# Patient Record
Sex: FEMALE | Race: AMERICAN INDIAN OR ALASKA NATIVE
[De-identification: names, ages, dates, MRNs, and addresses within clinical notes are randomized per-mention and may not be internally consistent; named-entity substitution may affect disease eponyms.]

---

## 2017-10-04 ENCOUNTER — HOSPITAL ENCOUNTER (EMERGENCY)
Dept: HOSPITAL 43 - DL.ED | Age: 42
Discharge: HOME | End: 2017-10-04
Payer: SELF-PAY

## 2017-10-04 VITALS — DIASTOLIC BLOOD PRESSURE: 77 MMHG | SYSTOLIC BLOOD PRESSURE: 159 MMHG

## 2017-10-04 DIAGNOSIS — M62.830: Primary | ICD-10-CM

## 2017-10-04 DIAGNOSIS — F17.210: ICD-10-CM

## 2017-10-04 PROCEDURE — 99283 EMERGENCY DEPT VISIT LOW MDM: CPT

## 2017-10-04 NOTE — EDM.PDOC
ED HPI GENERAL MEDICAL PROBLEM





- General


Chief Complaint: Back Pain or Injury


Stated Complaint: BACK PROBLEMS, [id number]


Time Seen by Provider: 10/04/17 21:35


Source of Information: Reports: Patient


History Limitations: Reports: No Limitations





- History of Present Illness


INITIAL COMMENTS - FREE TEXT/NARRATIVE: 





c/o sudden onset of low back pain while at work at [employer] this afternoon.. Has 

had similar episode one time prior years ago. Tried ibuprofen but has not 

helped. No prior injuries, Notes sx last tie resolved with muscle relaxants. 


Onset: Today


Treatments PTA: Reports: NSAIDS


  ** Lower Back


Pain Score (Numeric/FACES): 10





- Related Data


 Allergies











Allergy/AdvReac Type Severity Reaction Status Date / Time


 


No Known Allergies Allergy   Verified 10/04/17 20:02











Home Meds: 


 Home Meds





Acetaminophen [Tylenol] 650 tab PO ACBRK PRN 07/27/15 [History]


Ibuprofen 2 tab PO ASDIRECTED PRN 07/27/15 [History]











Past Medical History





- Past Health History


Medical/Surgical History: Denies Medical/Surgical History


Other Cardiovascular History: States she was told she had high blood pressure 

at one time but was not treated.  Had a heart murmur when she was younger.


Other Gastrointestinal History: had heartburn when she was pregnant


Other Neuro History: sinus headaches





- Past Surgical History


Other GI Surgeries/Procedures: history of alcohol poisoning





Social & Family History





- Tobacco Use


Smoking Status *Q: Current Every Day Smoker


Years of Tobacco use: 29


Packs/Tins Daily: 0.2


Used Tobacco, but Quit: No


Second Hand Smoke Exposure: No





- Caffeine Use


Caffeine Use: Reports: Coffee





- Alcohol Use


Days Per Week of Alcohol Use: 2


Number of Drinks Per Day: 5


Total Drinks Per Week: 10





- Recreational Drug Use


Recreational Drug Use: No





ED ROS GENERAL





- Review of Systems


Review Of Systems: See Below


Constitutional: Reports: No Symptoms


HEENT: Reports: No Symptoms


Respiratory: Reports: No Symptoms


Cardiovascular: Reports: No Symptoms


GI/Abdominal: Reports: No Symptoms


Musculoskeletal: Reports: Back Pain


Skin: Reports: No Symptoms


Neurological: Reports: No Symptoms





ED EXAM,LOWER BACK PAIN/INJURY





- Physical Exam


Exam: See Below


Exam Limited By: No Limitations


General Appearance: Alert, Mild Distress


Ears: Normal External Exam


Nose: Normal Inspection


Throat/Mouth: Normal Inspection


Head: Atraumatic


Neck: Normal Inspection, Full Range of Motion


Respiratory/Chest: No Respiratory Distress, Lungs Clear, Normal Breath Sounds


Cardiovascular: Normal Peripheral Pulses, Regular Rate, Rhythm


GI/Abdominal: Soft


Back Exam: Decreased Range of Motion, Muscle Spasm (right lower), Paraspinal 

Tenderness.  No: Vertebral Tenderness


Extremities: Normal Range of Motion


Neurological: Alert, Normal Mood/Affect, Normal Dorsiflexion, Normal Plantar 

Flexion, Normal Gait, No Motor/Sensory Deficits, Oriented x 3


Psychiatric: Normal Affect, Normal Mood


Skin Exam: Warm, Dry, Intact, Normal Color





Course





- Vital Signs


Last Recorded V/S: 


 Last Vital Signs











Temp  98.8 F   10/04/17 20:06


 


Pulse  78   10/04/17 20:06


 


Resp  20   10/04/17 20:06


 


BP  159/77 H  10/04/17 20:08


 


Pulse Ox  100   10/04/17 20:06














- Orders/Labs/Meds


Meds: 


Medications














Discontinued Medications














Generic Name Dose Route Start Last Admin





  Trade Name Freq  PRN Reason Stop Dose Admin


 


Cyclobenzaprine HCl  10 mg  10/04/17 21:43  10/04/17 21:48





  Flexeril  PO  10/04/17 21:44  10 mg





  ONETIME ONE   Administration


 


Ibuprofen  600 mg  10/04/17 21:43  10/04/17 21:48





  Motrin  PO  10/04/17 21:44  600 mg





  ONETIME ONE   Administration














Departure





- Departure


Time of Disposition: 22:10


Disposition: Home, Self-Care 01


Condition: Good


Clinical Impression: 


 Spasm of back muscles








- Discharge Information


Instructions:  Back Pain, Adult, Easy-to-Read


Referrals: 


 Raghavendra Li [Primary Care Provider] - 


Forms:  ED Department Discharge


Additional Instructions: 


flexeril 10mg one every 8 hours as needed for muscle spasm #4


Ibuprofen 600mg every 6 hours as needed for pain


clinic follow up on Friday if not improved


alternate heat and ice to low back

## 2020-01-07 ENCOUNTER — HOSPITAL ENCOUNTER (EMERGENCY)
Dept: HOSPITAL 43 - DL.ED | Age: 45
Discharge: LEFT BEFORE DISCHARGE | End: 2020-01-07
Payer: SELF-PAY

## 2020-01-07 VITALS — DIASTOLIC BLOOD PRESSURE: 94 MMHG | HEART RATE: 102 BPM | SYSTOLIC BLOOD PRESSURE: 142 MMHG

## 2020-01-07 DIAGNOSIS — F10.10: Primary | ICD-10-CM

## 2020-01-07 LAB
ANION GAP SERPL CALC-SCNC: 16.1 MMOL/L
APAP SERPL-SCNC: < 10 UG/ML
CHLORIDE SERPL-SCNC: 102 MMOL/L (ref 101–111)
SODIUM SERPL-SCNC: 140 MMOL/L (ref 135–145)

## 2020-01-07 PROCEDURE — 80305 DRUG TEST PRSMV DIR OPT OBS: CPT

## 2020-01-07 PROCEDURE — 80320 DRUG SCREEN QUANTALCOHOLS: CPT

## 2020-01-07 PROCEDURE — 81001 URINALYSIS AUTO W/SCOPE: CPT

## 2020-01-07 PROCEDURE — 85025 COMPLETE CBC W/AUTO DIFF WBC: CPT

## 2020-01-07 PROCEDURE — 83735 ASSAY OF MAGNESIUM: CPT

## 2020-01-07 PROCEDURE — G0480 DRUG TEST DEF 1-7 CLASSES: HCPCS

## 2020-01-07 PROCEDURE — 83690 ASSAY OF LIPASE: CPT

## 2020-01-07 PROCEDURE — 82150 ASSAY OF AMYLASE: CPT

## 2020-01-07 PROCEDURE — 96365 THER/PROPH/DIAG IV INF INIT: CPT

## 2020-01-07 PROCEDURE — 81025 URINE PREGNANCY TEST: CPT

## 2020-01-07 PROCEDURE — 80053 COMPREHEN METABOLIC PANEL: CPT

## 2020-01-07 PROCEDURE — 99284 EMERGENCY DEPT VISIT MOD MDM: CPT

## 2020-01-07 PROCEDURE — 80329 ANALGESICS NON-OPIOID 1 OR 2: CPT

## 2020-01-07 PROCEDURE — 82140 ASSAY OF AMMONIA: CPT

## 2020-01-07 PROCEDURE — 36415 COLL VENOUS BLD VENIPUNCTURE: CPT

## 2020-01-07 PROCEDURE — 99283 EMERGENCY DEPT VISIT LOW MDM: CPT

## 2020-01-07 NOTE — EDM.PDOC
ED HPI GENERAL MEDICAL PROBLEM





- General


Chief Complaint: Drug or Alcohol Abuse


Stated Complaint: AMBULANCE


Time Seen by Provider: 20 22:30


History Limitations: Reports: No Limitations





- History of Present Illness


INITIAL COMMENTS - FREE TEXT/NARRATIVE: 





This 43 yo female patient reports to the ED due to feeling dehydrated, having 

ringing in her right ear and having intermittent episodes of vomiting. The 

patient reports she drinks alcohol daily and vomits daily. The patient reports 

she has been drinking today, but does not think she is drunk at this time. The 

patient reports she is depressed due to her significant other dying. The 

patient reports that she does not want treatment. The patient reports she has 

no plans on harming herself. EMS reports the patient stated she was talking to 

her  significant other while in her home. 


Onset: Unknown/Unsure


Duration: Constant


Location: Reports: Other


Quality: Reports: Other


Severity: Moderate


Improves with: Reports: None


Worsens with: Reports: None


Context: Reports: Other


Associated Symptoms: Reports: Nausea/Vomiting, Other





- Related Data


 Allergies











Allergy/AdvReac Type Severity Reaction Status Date / Time


 


No Known Allergies Allergy   Verified 10/04/17 20:02











Home Meds: 


 Home Meds





Acetaminophen [Tylenol] 650 tab PO ACBRK PRN 07/27/15 [History]


Ibuprofen 2 tab PO ASDIRECTED PRN 07/27/15 [History]











Past Medical History





- Past Health History


Medical/Surgical History: Denies Medical/Surgical History


Other Cardiovascular History: States she was told she had high blood pressure 

at one time but was not treated.  Had a heart murmur when she was younger.


Other Gastrointestinal History: had heartburn when she was pregnant


Other Neuro History: sinus headaches





- Past Surgical History


Other GI Surgeries/Procedures: history of alcohol poisoning





Social & Family History





- Family History


Family Medical History: Noncontributory





- Tobacco Use


Smoking Status *Q: Never Smoker


Second Hand Smoke Exposure: No





- Caffeine Use


Caffeine Use: Reports: None





- Alcohol Use


Days Per Week of Alcohol Use: 7


Number of Drinks Per Day: 3


Total Drinks Per Week: 21





- Recreational Drug Use


Recreational Drug Use: No





ED ROS GENERAL





- Review of Systems


Review Of Systems: Comprehensive ROS is negative, except as noted in HPI.





- Physical Exam


Exam: See Below


Exam Limited By: No Limitations


General Appearance: Alert, WD/WN, Moderate Distress, Obese


Eye Exam: Bilateral Eye: EOMI, Normal Inspection, PERRL


Ears: Normal External Exam, Normal Canal, Hearing Grossly Normal, Other (right 

TM slightly erythematous with no visible fluids)


Nose: Normal Inspection, Normal Mucosa, No Blood


Throat/Mouth: Normal Inspection, Normal Lips, Normal Teeth, Normal Gums, Normal 

Oropharynx, Normal Voice, No Airway Compromise


Head Exam: Atraumatic, Normocephalic


Neck: Normal Inspection, Supple, Non-Tender, Full Range of Motion


Respiratory/Chest: No Respiratory Distress, Lungs Clear, Normal Breath Sounds, 

No Accessory Muscle Use, Chest Non-Tender


Cardiovascular: Normal Peripheral Pulses, Regular Rate, Rhythm, No Edema, No 

Gallop, No JVD, No Murmur, No Rub


GI/Abdominal: Normal Bowel Sounds, Soft, Non-Tender, No Organomegaly, No 

Distention, No Abnormal Bruit, No Mass


 (Female) Exam: Deferred


Rectal (Female) Exam: Deferred


Neuro Exam (Abbreviated): Alert, Oriented, CN II-XII Intact, Normal Cognition, 

Normal Gait, Normal Reflexes, No Motor/Sensory Deficits


Back Exam: Normal Inspection, Full Range of Motion, NT


Extremities: Normal Inspection, Normal Range of Motion, Non-Tender, No Pedal 

Edema, Normal Capillary Refill


Psychiatric: Normal Affect, Normal Mood


Skin Exam: Warm, Dry, Intact, Normal Color, No Rash





Course





- Vital Signs


Last Recorded V/S: 


 Last Vital Signs











Temp  36.1 C   20 22:20


 


Pulse  102 H  20 22:20


 


Resp  18   20 22:20


 


BP  142/94 H  20 22:20


 


Pulse Ox  97   20 22:20














- Orders/Labs/Meds


Labs: 


 Laboratory Tests











  20 Range/Units





  22:37 22:37 22:37 


 


WBC    9.8  (5.0-10.0)  10^3/uL


 


RBC    4.54  (4.2-5.4)  10^6/uL


 


Hgb    14.3  (12.0-16.0)  g/dL


 


Hct    41.7  (37.0-47.0)  %


 


MCV    91.9  D  ()  fL


 


MCH    31.5  (27.0-34.0)  pg


 


MCHC    34.3  (33.0-35.0)  g/dL


 


Plt Count    212  (150-450)  10^3/uL


 


Neut % (Auto)    44.2  (42.2-75.2)  %


 


Lymph % (Auto)    46.6  (20.5-50.1)  %


 


Mono % (Auto)    5.3  (2-8)  %


 


Eos % (Auto)    3.7 H  (1.0-3.0)  %


 


Baso % (Auto)    0.2  (0.0-1.0)  %


 


Sodium     (135-145)  mmol/L


 


Potassium     (3.6-5.0)  mmol/L


 


Chloride     (101-111)  mmol/L


 


Carbon Dioxide     (21.0-31.0)  mmol/L


 


Anion Gap     


 


BUN     (7-18)  mg/dL


 


Creatinine     (0.6-1.3)  mg/dL


 


Est Cr Clr Drug Dosing     mL/min


 


Estimated GFR (MDRD)     


 


BUN/Creatinine Ratio     


 


Glucose     ()  mg/dL


 


Calcium     (8.4-10.2)  mg/dl


 


Magnesium  1.8    (1.8-2.5)  mg/dL


 


Total Bilirubin     (0.2-1.0)  mg/dL


 


AST     (10-42)  IU/L


 


ALT     (10-60)  IU/L


 


Alkaline Phosphatase     ()  IU/L


 


Ammonia   27   (11-35)  umol/L


 


Total Protein     (6.7-8.2)  g/dl


 


Albumin     (3.2-5.5)  g/dl


 


Globulin     


 


Albumin/Globulin Ratio     


 


Amylase  51    ()  U/L


 


Lipase  39    (22-51)  U/L


 


Urine Color     (YELLOW)  


 


Urine Appearance     (CLEAR)  


 


Urine pH     (5.0-9.0)  


 


Ur Specific Gravity     (1.005-1.030)  


 


Urine Protein     (NEGATIVE)  


 


Urine Glucose (UA)     (NEGATIVE)  


 


Urine Ketones     (NEGATIVE)  


 


Urine Occult Blood     (NEGATIVE)  


 


Urine Nitrite     (NEGATIVE)  


 


Urine Bilirubin     (NEGATIVE)  


 


Urine Urobilinogen     (0.2-1.0)  mg/dL


 


Ur Leukocyte Esterase     (NEGATIVE)  


 


Urine RBC     /HPF


 


Urine WBC     (0-5/HPF)  /HPF


 


Ur Epithelial Cells     (NOT SEEN)  /HPF


 


Amorphous Sediment     (NOT SEEN)  /HPF


 


Urine Bacteria     (0-FEW/HPF)  /HPF


 


Urine Mucus     (NOT SEEN)  /LPF


 


Urine HCG, Qual     


 


Urine Opiates Screen     (NEGATIVE)  


 


Ur Oxycodone Screen     (NEGATIVE)  


 


Urine Methadone Screen     (NEGATIVE)  


 


Acetaminophen  < 10    ug/mL


 


Ur Barbiturates Screen     (NEGATIVE)  


 


U Tricyclic Antidepress     (NEGATIVE)  


 


Ur Phencyclidine Scrn     (NEGATIVE)  


 


Ur Amphetamine Screen     (NEGATIVE)  


 


U Methamphetamines Scrn     (NEGATIVE)  


 


Urine MDMA Screen     (NEGATIVE)  


 


U Benzodiazepines Scrn     (NEGATIVE)  


 


Urine Cocaine Screen     (NEGATIVE)  


 


U Marijuana (THC) Screen     (NEGATIVE)  


 


Ethyl Alcohol     mg/dL














  20 Range/Units





  22:37 23:06 23:06 


 


WBC     (5.0-10.0)  10^3/uL


 


RBC     (4.2-5.4)  10^6/uL


 


Hgb     (12.0-16.0)  g/dL


 


Hct     (37.0-47.0)  %


 


MCV     ()  fL


 


MCH     (27.0-34.0)  pg


 


MCHC     (33.0-35.0)  g/dL


 


Plt Count     (150-450)  10^3/uL


 


Neut % (Auto)     (42.2-75.2)  %


 


Lymph % (Auto)     (20.5-50.1)  %


 


Mono % (Auto)     (2-8)  %


 


Eos % (Auto)     (1.0-3.0)  %


 


Baso % (Auto)     (0.0-1.0)  %


 


Sodium  140    (135-145)  mmol/L


 


Potassium  3.1 L    (3.6-5.0)  mmol/L


 


Chloride  102    (101-111)  mmol/L


 


Carbon Dioxide  25.0    (21.0-31.0)  mmol/L


 


Anion Gap  16.1    


 


BUN  7    (7-18)  mg/dL


 


Creatinine  0.7    (0.6-1.3)  mg/dL


 


Est Cr Clr Drug Dosing  84.84    mL/min


 


Estimated GFR (MDRD)  > 60    


 


BUN/Creatinine Ratio  10.00    


 


Glucose  147 H    ()  mg/dL


 


Calcium  8.7    (8.4-10.2)  mg/dl


 


Magnesium     (1.8-2.5)  mg/dL


 


Total Bilirubin  0.7    (0.2-1.0)  mg/dL


 


AST  56 H    (10-42)  IU/L


 


ALT  43    (10-60)  IU/L


 


Alkaline Phosphatase  93    ()  IU/L


 


Ammonia     (11-35)  umol/L


 


Total Protein  8.5 H    (6.7-8.2)  g/dl


 


Albumin  4.1    (3.2-5.5)  g/dl


 


Globulin  4.4    


 


Albumin/Globulin Ratio  0.93    


 


Amylase     ()  U/L


 


Lipase     (22-51)  U/L


 


Urine Color   Light yellow   (YELLOW)  


 


Urine Appearance   Clear   (CLEAR)  


 


Urine pH   5.5   (5.0-9.0)  


 


Ur Specific Gravity   <= 1.005   (1.005-1.030)  


 


Urine Protein   Negative   (NEGATIVE)  


 


Urine Glucose (UA)   Negative   (NEGATIVE)  


 


Urine Ketones   Negative   (NEGATIVE)  


 


Urine Occult Blood   Trace-lysed H   (NEGATIVE)  


 


Urine Nitrite   Negative   (NEGATIVE)  


 


Urine Bilirubin   Negative   (NEGATIVE)  


 


Urine Urobilinogen   0.2   (0.2-1.0)  mg/dL


 


Ur Leukocyte Esterase   Negative   (NEGATIVE)  


 


Urine RBC   0-5   /HPF


 


Urine WBC   0-5   (0-5/HPF)  /HPF


 


Ur Epithelial Cells   Rare   (NOT SEEN)  /HPF


 


Amorphous Sediment   Rare   (NOT SEEN)  /HPF


 


Urine Bacteria   Rare   (0-FEW/HPF)  /HPF


 


Urine Mucus   Rare   (NOT SEEN)  /LPF


 


Urine HCG, Qual    Negative  


 


Urine Opiates Screen     (NEGATIVE)  


 


Ur Oxycodone Screen     (NEGATIVE)  


 


Urine Methadone Screen     (NEGATIVE)  


 


Acetaminophen     ug/mL


 


Ur Barbiturates Screen     (NEGATIVE)  


 


U Tricyclic Antidepress     (NEGATIVE)  


 


Ur Phencyclidine Scrn     (NEGATIVE)  


 


Ur Amphetamine Screen     (NEGATIVE)  


 


U Methamphetamines Scrn     (NEGATIVE)  


 


Urine MDMA Screen     (NEGATIVE)  


 


U Benzodiazepines Scrn     (NEGATIVE)  


 


Urine Cocaine Screen     (NEGATIVE)  


 


U Marijuana (THC) Screen     (NEGATIVE)  


 


Ethyl Alcohol  364    mg/dL














  20 Range/Units





  23:06 


 


WBC   (5.0-10.0)  10^3/uL


 


RBC   (4.2-5.4)  10^6/uL


 


Hgb   (12.0-16.0)  g/dL


 


Hct   (37.0-47.0)  %


 


MCV   ()  fL


 


MCH   (27.0-34.0)  pg


 


MCHC   (33.0-35.0)  g/dL


 


Plt Count   (150-450)  10^3/uL


 


Neut % (Auto)   (42.2-75.2)  %


 


Lymph % (Auto)   (20.5-50.1)  %


 


Mono % (Auto)   (2-8)  %


 


Eos % (Auto)   (1.0-3.0)  %


 


Baso % (Auto)   (0.0-1.0)  %


 


Sodium   (135-145)  mmol/L


 


Potassium   (3.6-5.0)  mmol/L


 


Chloride   (101-111)  mmol/L


 


Carbon Dioxide   (21.0-31.0)  mmol/L


 


Anion Gap   


 


BUN   (7-18)  mg/dL


 


Creatinine   (0.6-1.3)  mg/dL


 


Est Cr Clr Drug Dosing   mL/min


 


Estimated GFR (MDRD)   


 


BUN/Creatinine Ratio   


 


Glucose   ()  mg/dL


 


Calcium   (8.4-10.2)  mg/dl


 


Magnesium   (1.8-2.5)  mg/dL


 


Total Bilirubin   (0.2-1.0)  mg/dL


 


AST   (10-42)  IU/L


 


ALT   (10-60)  IU/L


 


Alkaline Phosphatase   ()  IU/L


 


Ammonia   (11-35)  umol/L


 


Total Protein   (6.7-8.2)  g/dl


 


Albumin   (3.2-5.5)  g/dl


 


Globulin   


 


Albumin/Globulin Ratio   


 


Amylase   ()  U/L


 


Lipase   (22-51)  U/L


 


Urine Color   (YELLOW)  


 


Urine Appearance   (CLEAR)  


 


Urine pH   (5.0-9.0)  


 


Ur Specific Gravity   (1.005-1.030)  


 


Urine Protein   (NEGATIVE)  


 


Urine Glucose (UA)   (NEGATIVE)  


 


Urine Ketones   (NEGATIVE)  


 


Urine Occult Blood   (NEGATIVE)  


 


Urine Nitrite   (NEGATIVE)  


 


Urine Bilirubin   (NEGATIVE)  


 


Urine Urobilinogen   (0.2-1.0)  mg/dL


 


Ur Leukocyte Esterase   (NEGATIVE)  


 


Urine RBC   /HPF


 


Urine WBC   (0-5/HPF)  /HPF


 


Ur Epithelial Cells   (NOT SEEN)  /HPF


 


Amorphous Sediment   (NOT SEEN)  /HPF


 


Urine Bacteria   (0-FEW/HPF)  /HPF


 


Urine Mucus   (NOT SEEN)  /LPF


 


Urine HCG, Qual   


 


Urine Opiates Screen  Negative  (NEGATIVE)  


 


Ur Oxycodone Screen  Negative  (NEGATIVE)  


 


Urine Methadone Screen  Negative  (NEGATIVE)  


 


Acetaminophen   ug/mL


 


Ur Barbiturates Screen  Negative  (NEGATIVE)  


 


U Tricyclic Antidepress  Negative  (NEGATIVE)  


 


Ur Phencyclidine Scrn  Negative  (NEGATIVE)  


 


Ur Amphetamine Screen  Negative  (NEGATIVE)  


 


U Methamphetamines Scrn  Negative  (NEGATIVE)  


 


Urine MDMA Screen  Negative  (NEGATIVE)  


 


U Benzodiazepines Scrn  Negative  (NEGATIVE)  


 


Urine Cocaine Screen  Negative  (NEGATIVE)  


 


U Marijuana (THC) Screen  Negative  (NEGATIVE)  


 


Ethyl Alcohol   mg/dL











Meds: 


Medications














Discontinued Medications














Generic Name Dose Route Start Last Admin





  Trade Name Freq  PRN Reason Stop Dose Admin


 


Multivitamins/Minerals 10 ml/  1,011.2 mls @ 999 mls/hr  20 22:29   22:40





Folic Acid 1 mg/ Thiamine HCl  IV  20 23:29  999 mls/hr





100 mg/ Lactated Ringer's  ONETIME ONE   Administration





     





     





     





     














- Re-Assessments/Exams


Free Text/Narrative Re-Assessment/Exam: 





20 23:34


The patient was advised of the examination and lab results during the visit. 

The patient was advised that her alcohol level is too high for me to release 

her on her own. The patient was advised that she will be sent to detox in order 

to be evaluated by the human services center in the morning. The patient stated 

that she has a friend that is going to come to get her from the ED. The patient 

was again encouraged to allow us to send her to Detox.





Departure





- Departure


Time of Disposition: 23:49


Disposition: Against Medical Advice 07


Condition: Poor


Clinical Impression: 


 Alcohol abuse, Left against medical advice








- Discharge Information


*PRESCRIPTION DRUG MONITORING PROGRAM REVIEWED*: Not Applicable


*COPY OF PRESCRIPTION DRUG MONITORING REPORT IN PATIENT CAM: Not Applicable


Forms:  ED Department Discharge


Care Plan Goals: 


The patient was advised of the examination and lab results during the visit. 

The patient was given IV fluids during the visit. The patient was advised that 

she needs to seek help from the [facility] for further evaluation 

and treatment. The patient was going to be sent to Detox, but the patient left 

against medical advice prior to arrangements could be made for transport to 

Detox. 





Sepsis Event Note





- Evaluation


Sepsis Screening Result: No Definite Risk





- Focused Exam


Vital Signs: 


 Vital Signs











  Temp Pulse Resp BP Pulse Ox


 


 20 22:20  36.1 C  102 H  18  142/94 H  97











Date Exam was Performed: 20


Time Exam was Performed: 23:49

## 2020-07-30 ENCOUNTER — HOSPITAL ENCOUNTER (EMERGENCY)
Dept: HOSPITAL 43 - DL.ED | Age: 45
Discharge: HOME | End: 2020-07-30
Payer: MEDICAID

## 2020-07-30 VITALS — DIASTOLIC BLOOD PRESSURE: 67 MMHG | HEART RATE: 93 BPM | SYSTOLIC BLOOD PRESSURE: 106 MMHG

## 2020-07-30 DIAGNOSIS — J18.9: Primary | ICD-10-CM

## 2020-07-30 DIAGNOSIS — Z79.899: ICD-10-CM

## 2020-07-30 DIAGNOSIS — Z20.828: ICD-10-CM

## 2020-07-30 DIAGNOSIS — I10: ICD-10-CM

## 2020-07-30 LAB
ANION GAP SERPL CALC-SCNC: 17 MEQ/L (ref 7–13)
CHLORIDE SERPL-SCNC: 100 MMOL/L (ref 98–107)
SODIUM SERPL-SCNC: 137 MMOL/L (ref 136–145)

## 2020-07-30 PROCEDURE — U0002 COVID-19 LAB TEST NON-CDC: HCPCS

## 2020-07-30 PROCEDURE — 93005 ELECTROCARDIOGRAM TRACING: CPT

## 2020-07-30 PROCEDURE — 96365 THER/PROPH/DIAG IV INF INIT: CPT

## 2020-07-30 PROCEDURE — 83605 ASSAY OF LACTIC ACID: CPT

## 2020-07-30 PROCEDURE — 87040 BLOOD CULTURE FOR BACTERIA: CPT

## 2020-07-30 PROCEDURE — 99283 EMERGENCY DEPT VISIT LOW MDM: CPT

## 2020-07-30 PROCEDURE — 80053 COMPREHEN METABOLIC PANEL: CPT

## 2020-07-30 PROCEDURE — 99285 EMERGENCY DEPT VISIT HI MDM: CPT

## 2020-07-30 PROCEDURE — 71046 X-RAY EXAM CHEST 2 VIEWS: CPT

## 2020-07-30 PROCEDURE — 84484 ASSAY OF TROPONIN QUANT: CPT

## 2020-07-30 PROCEDURE — 36415 COLL VENOUS BLD VENIPUNCTURE: CPT

## 2020-07-30 PROCEDURE — 96375 TX/PRO/DX INJ NEW DRUG ADDON: CPT

## 2020-07-30 PROCEDURE — 85025 COMPLETE CBC W/AUTO DIFF WBC: CPT

## 2020-07-30 PROCEDURE — 87635 SARS-COV-2 COVID-19 AMP PRB: CPT

## 2020-07-30 NOTE — CR
PROCEDURE INFORMATION: 

Exam: XR Chest, 2 Views 

Exam date and time: 7/30/2020 5:18 PM 

Age: 45 years old 

Clinical indication: Shortness of breath; Additional info: Short of breath 



TECHNIQUE: 

Imaging protocol: XR of the chest 

Views: 2 views. 



COMPARISON: 

No relevant prior studies available. 



FINDINGS: 

Lungs: Patchy infiltrate in the left mid lung field correlate with CT.

Pleural space: Unremarkable. No pleural effusion. No pneumothorax. 

Heart/Mediastinum: Unremarkable. No cardiomegaly. 

Bones/joints: Diastasis of the acromioclavicular joint on the left and possibly 

on the right.. 



IMPRESSION: 

Patchy infiltrate in the left mid lung field. Correlate with CT.

Diastasis of the acromioclavicular joint on the left and possibly on the 

right..

## 2020-07-30 NOTE — EDM.PDOC
ED HPI GENERAL MEDICAL PROBLEM





- General


Chief Complaint: Respiratory Problem


Stated Complaint: AMBULANCE


Time Seen by Provider: 07/30/20 16:50


Source of Information: Reports: Patient


History Limitations: Reports: No Limitations





- History of Present Illness


INITIAL COMMENTS - FREE TEXT/NARRATIVE: 





This 44 yo female patient reports to the ED due to increased shortness of 

breath. The patient was seen at the [facility] yesterday and started on 

a Z-pack and Prednisone. The patient reports she has taken the Azithromycin, but

has not started the prednisone at this time. The patient reports she was advised

to only take the prednisone with food and the patient has not eaten since she 

got the medications. 


Duration: Day(s):, Constant, Getting Worse


Location: Reports: Chest


Quality: Reports: Other


Severity: Moderate


Improves with: Reports: None


Worsens with: Reports: None


Associated Symptoms: Reports: Cough, Shortness of Breath


Treatments PTA: Reports: Other Medication(s)





- Related Data


                                    Allergies











Allergy/AdvReac Type Severity Reaction Status Date / Time


 


No Known Allergies Allergy   Verified 07/30/20 16:18











Home Meds: 


                                    Home Meds





Acetaminophen [Tylenol] 650 tab PO ACBRK PRN 07/27/15 [History]


Ibuprofen 2 tab PO ASDIRECTED PRN 07/27/15 [History]


lisinopriL [Lisinopril] 10 mg PO DAILY 07/30/20 [History]











Past Medical History





- Past Health History


Medical/Surgical History: Denies Medical/Surgical History


Cardiovascular History: Reports: Hypertension


Other Cardiovascular History: Had a heart murmur when she was younger.


Gastrointestinal History: Reports: GERD


Other Gastrointestinal History: had heartburn when she was pregnant


Other Neuro History: sinus headaches





- Past Surgical History


Other GI Surgeries/Procedures: history of alcohol poisoning





Social & Family History





- Family History


Family Medical History: Noncontributory





- Tobacco Use


Smoking Status *Q: Never Smoker





- Caffeine Use


Caffeine Use: Reports: Coffee





- Alcohol Use


Days Per Week of Alcohol Use: 2


Number of Drinks Per Day: 4


Total Drinks Per Week: 8





- Recreational Drug Use


Recreational Drug Use: No





ED ROS GENERAL





- Review of Systems


Review Of Systems: Comprehensive ROS is negative, except as noted in HPI.





ED EXAM, GENERAL





- Physical Exam


Exam: See Below


Exam Limited By: No Limitations


General Appearance: Alert, WD/WN, Mild Distress


Eye Exam: Bilateral Eye: EOMI, Normal Inspection, PERRL


Ears: Normal External Exam, Normal Canal, Hearing Grossly Normal, Normal TMs


Nose: Normal Inspection, Normal Mucosa, No Blood


Throat/Mouth: Normal Inspection, Normal Lips, Normal Teeth, Normal Gums, Normal 

Oropharynx, Normal Voice, No Airway Compromise


Head: Atraumatic, Normocephalic


Neck: Normal Inspection, Supple, Non-Tender, Full Range of Motion


Respiratory/Chest: No Accessory Muscle Use, Chest Non-Tender, Decreased Breath 

Sounds (diffuse)


Cardiovascular: Normal Peripheral Pulses, Regular Rate, Rhythm, No Edema, No 

Gallop, No JVD, No Murmur, No Rub


GI/Abdominal: Normal Bowel Sounds, Soft, Non-Tender, No Organomegaly, No 

Distention, No Abnormal Bruit, No Mass


 (Female) Exam: Deferred


Rectal (Female) Exam: Deferred


Back Exam: Normal Inspection, Full Range of Motion, NT


Extremities: Normal Inspection, Normal Range of Motion, Non-Tender, Normal 

Capillary Refill, No Pedal Edema


Neurological: Alert, Oriented, CN II-XII Intact, Normal Cognition, Normal Gait, 

Normal Reflexes, No Motor/Sensory Deficits


Psychiatric: Normal Affect, Normal Mood


Skin Exam: Warm, Dry, Intact, Normal Color, No Rash


Lymphatic: No Adenopathy





Course





- Vital Signs


Last Recorded V/S: 


                                Last Vital Signs











Temp  36.9 C   07/30/20 16:18


 


Pulse  93   07/30/20 16:18


 


Resp  20   07/30/20 16:18


 


BP  106/67   07/30/20 16:18


 


Pulse Ox  93 L  07/30/20 16:18














- Orders/Labs/Meds


Orders: 


                               Active Orders 24 hr











 Category Date Time Status


 


 EKG Documentation Completion [RC] STAT Care  07/30/20 15:55 Active


 


 CULTURE BLOOD [BC] Stat Lab  07/30/20 16:17 Received











Labs: 


                                Laboratory Tests











  07/30/20 07/30/20 07/30/20 Range/Units





  16:00 16:17 16:17 


 


WBC   11.7 H   (5.0-10.0)  10^3/uL


 


RBC   4.42   (4.2-5.4)  10^6/uL


 


Hgb   13.9   (12.0-16.0)  g/dL


 


Hct   40.8   (37.0-47.0)  %


 


MCV   92.3   ()  fL


 


MCH   31.4   (27.0-34.0)  pg


 


MCHC   34.1   (33.0-35.0)  g/dL


 


Plt Count   161   (150-450)  10^3/uL


 


Neut % (Auto)   85.1 H   (42.2-75.2)  %


 


Lymph % (Auto)   11.1 L   (20.5-50.1)  %


 


Mono % (Auto)   3.8   (2-8)  %


 


Eos % (Auto)   0.0 L   (1.0-3.0)  %


 


Baso % (Auto)   0.0   (0.0-1.0)  %


 


Sodium    137  (136-145)  mmol/L


 


Potassium    3.0 L  (3.5-5.1)  mmol/L


 


Chloride    100  ()  mmol/L


 


Carbon Dioxide    23  (21-32)  mmol/L


 


Anion Gap    17.0 H  (7-13)  mEq/L


 


BUN    15  (7-18)  mg/dL


 


Creatinine    1.55 H  (0.55-1.02)  mg/dL


 


Est Cr Clr Drug Dosing    TNP  


 


Estimated GFR (MDRD)    36  


 


BUN/Creatinine Ratio    9.7  (No establ ref range)  


 


Glucose    103 H  (74-99)  mg/dL


 


Lactic Acid     (0.4-2.0)  mmol/L


 


Calcium    8.1 L  (8.5-10.1)  mg/dL


 


Total Bilirubin    0.6  (0.2-1.0)  mg/dL


 


AST    118 H  (15-37)  U/L


 


ALT    77 H  (14-59)  U/L


 


Alkaline Phosphatase    114  ()  U/L


 


Troponin I    < 0.017  (0.000-0.056)  ng/mL


 


Total Protein    8.2  (6.4-8.2)  g/dL


 


Albumin    3.3 L  (3.4-5.0)  g/dL


 


Globulin    4.9  


 


Albumin/Globulin Ratio    0.67  


 


COVID-19 (S[facility]LD[facility])  Negative    (NEGATIVE)  














  07/30/20 Range/Units





  16:17 


 


WBC   (5.0-10.0)  10^3/uL


 


RBC   (4.2-5.4)  10^6/uL


 


Hgb   (12.0-16.0)  g/dL


 


Hct   (37.0-47.0)  %


 


MCV   ()  fL


 


MCH   (27.0-34.0)  pg


 


MCHC   (33.0-35.0)  g/dL


 


Plt Count   (150-450)  10^3/uL


 


Neut % (Auto)   (42.2-75.2)  %


 


Lymph % (Auto)   (20.5-50.1)  %


 


Mono % (Auto)   (2-8)  %


 


Eos % (Auto)   (1.0-3.0)  %


 


Baso % (Auto)   (0.0-1.0)  %


 


Sodium   (136-145)  mmol/L


 


Potassium   (3.5-5.1)  mmol/L


 


Chloride   ()  mmol/L


 


Carbon Dioxide   (21-32)  mmol/L


 


Anion Gap   (7-13)  mEq/L


 


BUN   (7-18)  mg/dL


 


Creatinine   (0.55-1.02)  mg/dL


 


Est Cr Clr Drug Dosing   


 


Estimated GFR (MDRD)   


 


BUN/Creatinine Ratio   (No establ ref range)  


 


Glucose   (74-99)  mg/dL


 


Lactic Acid  1.1  (0.4-2.0)  mmol/L


 


Calcium   (8.5-10.1)  mg/dL


 


Total Bilirubin   (0.2-1.0)  mg/dL


 


AST   (15-37)  U/L


 


ALT   (14-59)  U/L


 


Alkaline Phosphatase   ()  U/L


 


Troponin I   (0.000-0.056)  ng/mL


 


Total Protein   (6.4-8.2)  g/dL


 


Albumin   (3.4-5.0)  g/dL


 


Globulin   


 


Albumin/Globulin Ratio   


 


COVID-19 (S[facility])   (NEGATIVE)  











Meds: 


Medications














Discontinued Medications














Generic Name Dose Route Start Last Admin





  Trade Name Freq  PRN Reason Stop Dose Admin


 


Ceftriaxone Sodium 1 gm/  50 mls @ 100 mls/hr  07/30/20 17:42  07/30/20 18:17





  Sodium Chloride  IV  07/30/20 18:11  100 mls/hr





  ONETIME ONE   Administration


 


Methylprednisolone Sodium Succinate  125 mg  07/30/20 17:42  07/30/20 18:11





  Solu-Medrol  IVPUSH  07/30/20 17:43  125 mg





  ONETIME ONE   Administration














Departure





- Departure


Time of Disposition: 18:35


Disposition: Home, Self-Care 01


Condition: Fair


Clinical Impression: 


Community acquired pneumonia


Qualifiers:


 Laterality: left Lung location: unspecified part of lung Qualified Code(s): 

J18.9 - Pneumonia, unspecified organism








- Discharge Information


*PRESCRIPTION DRUG MONITORING PROGRAM REVIEWED*: Not Applicable


*COPY OF PRESCRIPTION DRUG MONITORING REPORT IN PATIENT CAM: Not Applicable


Instructions:  Community-Acquired Pneumonia, Adult, Easy-to-Read


Forms:  ED Department Discharge


Care Plan Goals: 


The patient was advised of the examination, lab, x-ray and EKG results during 

the visit. The patient was given in IV dose of SoluMedrol and IV Rocephin while 

in the ED. The patient was encouraged to take her medications as previously 

prescribed. If the patient has any additional symptoms or concerns, the patient 

should either return to the emergency department or visit her primary care 

facility. 





Sepsis Event Note (ED)





- Evaluation


Sepsis Screening Result: No Definite Risk





- Focused Exam


Vital Signs: 


                                   Vital Signs











  Temp Pulse Resp BP Pulse Ox


 


 07/30/20 16:18  36.9 C  93  20  106/67  93 L














- My Orders


Last 24 Hours: 


My Active Orders





07/30/20 15:55


EKG Documentation Completion [RC] STAT 





07/30/20 16:17


CULTURE BLOOD [BC] Stat 














- Assessment/Plan


Last 24 Hours: 


My Active Orders





07/30/20 15:55


EKG Documentation Completion [RC] STAT 





07/30/20 16:17


CULTURE BLOOD [BC] Stat

## 2021-11-19 ENCOUNTER — HOSPITAL ENCOUNTER (EMERGENCY)
Dept: HOSPITAL 43 - DL.ED | Age: 46
Discharge: LEFT BEFORE DISCHARGE | End: 2021-11-19
Payer: MEDICAID

## 2021-11-19 VITALS — DIASTOLIC BLOOD PRESSURE: 91 MMHG | SYSTOLIC BLOOD PRESSURE: 139 MMHG | HEART RATE: 82 BPM

## 2021-11-19 DIAGNOSIS — Z79.899: ICD-10-CM

## 2021-11-19 DIAGNOSIS — R10.9: Primary | ICD-10-CM

## 2021-11-19 DIAGNOSIS — F10.129: ICD-10-CM

## 2021-11-19 DIAGNOSIS — I10: ICD-10-CM

## 2021-11-19 DIAGNOSIS — K21.9: ICD-10-CM
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
## 2021-11-19 LAB
AMPHET UR QL SCN: NEGATIVE
AMPHET UR QL SCN: NEGATIVE
AMPHETAMINES UR QL SCN>500 NG/ML: NEGATIVE
ANION GAP SERPL CALC-SCNC: 17.3 MEQ/L (ref 7–13)
APAP SERPL-SCNC: 0 UG/ML
BARBITURATES UR QL SCN: NEGATIVE
CHLORIDE SERPL-SCNC: 102 MMOL/L (ref 98–107)
MDMA UR QL SCN: NEGATIVE
OXYCODONE UR QL SCN: NEGATIVE
PCP UR QL SCN: NEGATIVE
SODIUM SERPL-SCNC: 142 MMOL/L (ref 136–145)
SP GR UR STRIP: 1.01 (ref 1–1.03)
TRICYCLICS UR QL SCN: NEGATIVE

## 2021-11-19 NOTE — EDM.PDOC
ED HPI GENERAL MEDICAL PROBLEM





- General


Chief Complaint: Back Pain or Injury


Stated Complaint: BACK PAIN


Time Seen by Provider: 11/19/21 13:00


Source of Information: Reports: Patient


History Limitations: Reports: No Limitations





- History of Present Illness


INITIAL COMMENTS - FREE TEXT/NARRATIVE: 





This 47 yo female patient reports to the ED with lower back pain. The patient 

reports her symptoms have been getting worse over the past week. The patient 

initially denied any similar symptoms, but later remembered she had a history of

kidney stones. The patient admits to drinking alcohol today, but denies any drug

use. The patient has not been seen for her current symptoms and has not taken 

anything for temporary symptom relief. 


Onset: Gradual


Duration: Day(s):, Constant, Getting Worse


Location: Reports: Back


Quality: Reports: Ache, Sharp


Severity: Moderate


Improves with: Reports: None


Worsens with: Reports: None


Context: Reports: Other


Associated Symptoms: Reports: No Other Symptoms


Treatments PTA: Denies: Acetaminophen, Aspirin, NSAIDS


  ** right flank


Pain Score (Numeric/FACES): 5





- Related Data


                                    Allergies











Allergy/AdvReac Type Severity Reaction Status Date / Time


 


No Known Allergies Allergy   Verified 11/19/21 13:45











Home Meds: 


                                    Home Meds





Acetaminophen [Tylenol] 650 tab PO ACBRK PRN 07/27/15 [History]


Ibuprofen 2 tab PO ASDIRECTED PRN 07/27/15 [History]


lisinopriL [Lisinopril] 10 mg PO DAILY 07/30/20 [History]











Past Medical History





- Past Health History


Medical/Surgical History: Denies Medical/Surgical History


Cardiovascular History: Reports: Hypertension


Other Cardiovascular History: Had a heart murmur when she was younger.


Gastrointestinal History: Reports: GERD


Other Gastrointestinal History: had heartburn when she was pregnant


Other Neuro History: sinus headaches





- Past Surgical History


Other GI Surgeries/Procedures: history of alcohol poisoning





Social & Family History





- Family History


Family Medical History: No Pertinent Family History





- Caffeine Use


Caffeine Use: Reports: Coffee





ED ROS GENERAL





- Review of Systems


Review Of Systems: Comprehensive ROS is negative, except as noted in HPI.





ED EXAM,LOWER BACK PAIN/INJURY





- Physical Exam


Exam: See Below


Exam Limited By: No Limitations


General Appearance: Alert, WD/WN, Moderate Distress, Obese


Eye Exam: Bilateral Eye: EOMI, Normal Inspection, PERRL


Ears: Normal External Exam, Normal Canal, Hearing Grossly Normal, Normal TMs


Nose: Normal Inspection, Normal Mucosa, No Blood


Throat/Mouth: Normal Inspection, Normal Lips, Normal Teeth, Normal Gums, Normal 

Oropharynx, Normal Voice, No Airway Compromise


Head: Atraumatic, Normocephalic


Neck: Normal Inspection, Supple, Non-Tender, Full Range of Motion


Respiratory/Chest: No Respiratory Distress, Lungs Clear, Normal Breath Sounds, 

No Accessory Muscle Use, Chest Non-Tender


Cardiovascular: Normal Peripheral Pulses, Regular Rate, Rhythm, No Edema, No 

Gallop, No JVD, No Murmur, No Rub


GI/Abdominal: Normal Bowel Sounds, Soft, Non-Tender, No Organomegaly, No 

Distention, No Abnormal Bruit, No Mass


 (Female) Exam: Deferred


Rectal (Female) Exam: Deferred


Back Exam: CVA Tenderness (L), CVA Tenderness (R), Paraspinal Tenderness


Extremities: Normal Inspection, Normal Range of Motion, Non-Tender, No Pedal 

Edema, Normal Capillary Refill


Neurological: Alert, Normal Mood/Affect, Normal Dorsiflexion, CN II-XII Intact, 

Normal Plantar Flexion, Normal Gait, Normal Reflexes, No Motor/Sensory Deficits,

 Oriented x 3


Psychiatric: Normal Affect, Normal Mood


Skin Exam: Warm, Dry, Intact, Normal Color, No Rash


Lymphatic: No Adenopathy





Course





- Vital Signs


Last Recorded V/S: 


                                Last Vital Signs











Temp  98.1 F   11/19/21 12:50


 


Pulse  82   11/19/21 12:50


 


Resp  18   11/19/21 12:50


 


BP  139/91 H  11/19/21 12:50


 


Pulse Ox  93 L  11/19/21 12:50














- Orders/Labs/Meds


Orders: 


                               Active Orders 24 hr











 Category Date Time Status


 


 Sodium Chloride 0.9% [Normal Saline] 1,000 ml Med  11/19/21 14:43 Ordered





 IV .BOLUS   








                                Medication Orders





Sodium Chloride (Normal Saline)  1,000 mls @ 999 mls/hr IV .BOLUS ONE


   Stop: 11/19/21 15:43








Labs: 


                                Laboratory Tests











  11/19/21 11/19/21 11/19/21 Range/Units





  12:55 12:55 13:35 


 


WBC    7.3  (5.0-10.0)  10^3/uL


 


RBC    4.36  (4.2-5.4)  10^6/uL


 


Hgb    13.9  (12.0-16.0)  g/dL


 


Hct    41.4  (37.0-47.0)  %


 


MCV    95.0  ()  fL


 


MCH    31.9  (27.0-34.0)  pg


 


MCHC    33.6  (33.0-35.0)  g/dL


 


Plt Count    67 L D  (150-450)  10^3/uL


 


Neut % (Auto)    45.2  (42.2-75.2)  %


 


Lymph % (Auto)    41.5  (20.5-50.1)  %


 


Mono % (Auto)    9.3 H  (2-8)  %


 


Eos % (Auto)    3.6 H  (1.0-3.0)  %


 


Baso % (Auto)    0.4  (0.0-1.0)  %


 


Sodium     (136-145)  mmol/L


 


Potassium     (3.5-5.1)  mmol/L


 


Chloride     ()  mmol/L


 


Carbon Dioxide     (21-32)  mmol/L


 


Anion Gap     (7-13)  mEq/L


 


BUN     (7-18)  mg/dL


 


Creatinine     (0.55-1.02)  mg/dL


 


Est Cr Clr Drug Dosing     mL/min


 


Estimated GFR (MDRD)     


 


BUN/Creatinine Ratio     (No establ ref range)  


 


Glucose     (70-99)  mg/dL


 


Calcium     (8.5-10.1)  mg/dL


 


Magnesium     (1.8-2.4)  mg/dL


 


Total Bilirubin     (0.2-1.0)  mg/dL


 


AST     (15-37)  U/L


 


ALT     (14-59)  U/L


 


Alkaline Phosphatase     ()  U/L


 


Ammonia     (11-32)  umol/L


 


Total Protein     (6.4-8.2)  g/dL


 


Albumin     (3.4-5.0)  g/dL


 


Globulin     


 


Albumin/Globulin Ratio     


 


Urine Color  Yellow    (YELLOW)  


 


Urine Appearance  Clear    (CLEAR)  


 


Urine pH  7.0    (5.0-9.0)  


 


Ur Specific Gravity  1.010    (1.005-1.030)  


 


Urine Protein  Negative    (NEGATIVE)  


 


Urine Glucose (UA)  Negative    (NEGATIVE)  


 


Urine Ketones  Negative    (NEGATIVE)  


 


Urine Occult Blood  Trace-intact H    (NEGATIVE)  


 


Urine Nitrite  Negative    (NEGATIVE)  


 


Urine Bilirubin  Negative    (NEGATIVE)  


 


Urine Urobilinogen  0.2    (0.2-1.0)  mg/dL


 


Ur Leukocyte Esterase  Negative    (NEGATIVE)  


 


Urine RBC  0-5    (0-5)  /HPF


 


Urine WBC  0-5    (0-5/HPF)  /HPF


 


Ur Epithelial Cells  Few    (NOT SEEN)  /HPF


 


Urine Bacteria  Few    (0-FEW/HPF)  /HPF


 


Urine Yeast  Occasional H    (NOT SEEN)  /HPF


 


Salicylates     (2.8-20(Therapeutic))  mg/dL


 


Urine Opiates Screen   Negative   (NEGATIVE)  


 


Ur Oxycodone Screen   Negative   (NEGATIVE)  


 


Urine Methadone Screen   Negative   (NEGATIVE)  


 


Acetaminophen     (10-30 (Therapeutic))  ug/mL


 


Ur Barbiturates Screen   Negative   (NEGATIVE)  


 


U Tricyclic Antidepress   Negative   (NEGATIVE)  


 


Ur Phencyclidine Scrn   Negative   (NEGATIVE)  


 


Ur Amphetamine Screen   Negative   (NEGATIVE)  


 


U Methamphetamines Scrn   Negative   (NEGATIVE)  


 


Urine MDMA Screen   Negative   (NEGATIVE)  


 


U Benzodiazepines Scrn   Negative   (NEGATIVE)  


 


Urine Cocaine Screen   Negative   (NEGATIVE)  


 


U Marijuana (THC) Screen   Negative   (NEGATIVE)  


 


Ethyl Alcohol     (0)  mg/dL














  11/19/21 11/19/21 11/19/21 Range/Units





  13:35 13:35 13:35 


 


WBC     (5.0-10.0)  10^3/uL


 


RBC     (4.2-5.4)  10^6/uL


 


Hgb     (12.0-16.0)  g/dL


 


Hct     (37.0-47.0)  %


 


MCV     ()  fL


 


MCH     (27.0-34.0)  pg


 


MCHC     (33.0-35.0)  g/dL


 


Plt Count     (150-450)  10^3/uL


 


Neut % (Auto)     (42.2-75.2)  %


 


Lymph % (Auto)     (20.5-50.1)  %


 


Mono % (Auto)     (2-8)  %


 


Eos % (Auto)     (1.0-3.0)  %


 


Baso % (Auto)     (0.0-1.0)  %


 


Sodium  142    (136-145)  mmol/L


 


Potassium  3.3 L    (3.5-5.1)  mmol/L


 


Chloride  102    ()  mmol/L


 


Carbon Dioxide  26    (21-32)  mmol/L


 


Anion Gap  17.3 H    (7-13)  mEq/L


 


BUN  6 L    (7-18)  mg/dL


 


Creatinine  0.64    (0.55-1.02)  mg/dL


 


Est Cr Clr Drug Dosing  86.87    mL/min


 


Estimated GFR (MDRD)  > 60    


 


BUN/Creatinine Ratio  9.4    (No establ ref range)  


 


Glucose  105 H    (70-99)  mg/dL


 


Calcium  8.6    (8.5-10.1)  mg/dL


 


Magnesium  1.6 L    (1.8-2.4)  mg/dL


 


Total Bilirubin  0.7    (0.2-1.0)  mg/dL


 


AST  356 H    (15-37)  U/L


 


ALT  115 H    (14-59)  U/L


 


Alkaline Phosphatase  145 H    ()  U/L


 


Ammonia   22   (11-32)  umol/L


 


Total Protein  8.9 H    (6.4-8.2)  g/dL


 


Albumin  3.8    (3.4-5.0)  g/dL


 


Globulin  5.1    


 


Albumin/Globulin Ratio  0.7    


 


Urine Color     (YELLOW)  


 


Urine Appearance     (CLEAR)  


 


Urine pH     (5.0-9.0)  


 


Ur Specific Gravity     (1.005-1.030)  


 


Urine Protein     (NEGATIVE)  


 


Urine Glucose (UA)     (NEGATIVE)  


 


Urine Ketones     (NEGATIVE)  


 


Urine Occult Blood     (NEGATIVE)  


 


Urine Nitrite     (NEGATIVE)  


 


Urine Bilirubin     (NEGATIVE)  


 


Urine Urobilinogen     (0.2-1.0)  mg/dL


 


Ur Leukocyte Esterase     (NEGATIVE)  


 


Urine RBC     (0-5)  /HPF


 


Urine WBC     (0-5/HPF)  /HPF


 


Ur Epithelial Cells     (NOT SEEN)  /HPF


 


Urine Bacteria     (0-FEW/HPF)  /HPF


 


Urine Yeast     (NOT SEEN)  /HPF


 


Salicylates    < 2.8 L  (2.8-20(Therapeutic))  mg/dL


 


Urine Opiates Screen     (NEGATIVE)  


 


Ur Oxycodone Screen     (NEGATIVE)  


 


Urine Methadone Screen     (NEGATIVE)  


 


Acetaminophen  0 L    (10-30 (Therapeutic))  ug/mL


 


Ur Barbiturates Screen     (NEGATIVE)  


 


U Tricyclic Antidepress     (NEGATIVE)  


 


Ur Phencyclidine Scrn     (NEGATIVE)  


 


Ur Amphetamine Screen     (NEGATIVE)  


 


U Methamphetamines Scrn     (NEGATIVE)  


 


Urine MDMA Screen     (NEGATIVE)  


 


U Benzodiazepines Scrn     (NEGATIVE)  


 


Urine Cocaine Screen     (NEGATIVE)  


 


U Marijuana (THC) Screen     (NEGATIVE)  


 


Ethyl Alcohol  384    (0)  mg/dL











Meds: 


Medications











Generic Name Dose Route Start Last Admin





  Trade Name Freq  PRN Reason Stop Dose Admin


 


Sodium Chloride  1,000 mls @ 999 mls/hr  11/19/21 14:43 





  Normal Saline  IV  11/19/21 15:43 





  .BOLUS ONE  














Departure





- Departure


Time of Disposition: 15:24


Disposition: Against Medical Advice 07


Condition: Undetermined


Clinical Impression: 


 Left against medical advice, Abdominal pain, Alcohol abuse





Alcohol intoxication


Qualifiers:


 Complication of substance-induced condition: with unspecified complication 

Qualified Code(s): F10.929 - Alcohol use, unspecified with intoxication, 

unspecified








- Discharge Information


*PRESCRIPTION DRUG MONITORING PROGRAM REVIEWED*: Not Applicable


*COPY OF PRESCRIPTION DRUG MONITORING REPORT IN PATIENT CAM: Not Applicable


Forms:  ED Department Discharge


Care Plan Goals: 


This patient left against medical advice without being informed of the CT 

results. The patient also left prior to any treatments for current pain. 





Sepsis Event Note (ED)





- Focused Exam


Vital Signs: 


                                   Vital Signs











  Temp Pulse Resp BP Pulse Ox


 


 11/19/21 12:50  98.1 F  82  18  139/91 H  93 L














- My Orders


Last 24 Hours: 


My Active Orders





11/19/21 14:43


Sodium Chloride 0.9% [Normal Saline] 1,000 ml IV .BOLUS 














- Assessment/Plan


Last 24 Hours: 


My Active Orders





11/19/21 14:43


Sodium Chloride 0.9% [Normal Saline] 1,000 ml IV .BOLUS

## 2021-11-19 NOTE — CT
EXAMINATION: Abdomen Pelvis wo Cont  SEX: Female   AGE: 46 years

 

CLINICAL HISTORY: 46-year-old 179 pound hypertensive female with back pain and

hematuria.

 

Scan technique: Volume acquisition of data unenhanced screening CT abdomen and

pelvis obtained with the patient lying supine on the Siemens multislice scanner

[place], [place]. All data archived in the PACS

system for storage, reformatting axial/sagittal/coronal planes and study.

 

Interpretation:

1. Negative lower thoracic and lumbar spine (minimal spondylosis). No pathologic

skeletal lesion, fracture or dislocation.

2. Homogeneously dense fatty liver. Surgically absent gallbladder (clips RUQ).

No intrahepatic mass or duct dilatation.

3. Small 2 cm diameter round, water density, left suprarenal (adrenal cyst) mass

lesion. Stomach, spleen, and kidneys anatomically correct and otherwise

unremarkable. No abdominal mass or retroperitoneal lymphadenopathy.

4. Normal reniform size, axis and configuration. No renal cortical mass lesion,

nephrolithiasis or signs of obstructive uropathy. Unenhanced urinary bladder

appears to have mass left of midline. Recommend bladder ultrasound.

5. Normal caliber aortoiliac vessels. No aneurysm or dissection. Normal midline

uterus. Small adnexal (ovarian) cysts (largest on the left measures 17 mm

diameter.

6. Lung bases clear. No pericardial or pleural effusions. Normal caliber

aortoiliac vessels.

 

CONCLUSION: Left adrenal cyst. Fatty liver. Physiologic ovarian cysts. 

 

*Questionable mass urinary bladder. Ultrasound or delayed contrast enhanced

bladder follow-up recommended.

 

No other signs of abdominal/pelvic malignancy, peritonitis or mechanical bowel

obstruction.

## 2022-04-25 ENCOUNTER — HOSPITAL ENCOUNTER (INPATIENT)
Dept: HOSPITAL 43 - DL.ED | Age: 47
LOS: 4 days | Discharge: HOME | DRG: 871 | End: 2022-04-29
Attending: INTERNAL MEDICINE | Admitting: INTERNAL MEDICINE
Payer: MEDICAID

## 2022-04-25 DIAGNOSIS — Z20.822: ICD-10-CM

## 2022-04-25 DIAGNOSIS — E88.09: ICD-10-CM

## 2022-04-25 DIAGNOSIS — E87.6: ICD-10-CM

## 2022-04-25 DIAGNOSIS — I10: ICD-10-CM

## 2022-04-25 DIAGNOSIS — E66.09: ICD-10-CM

## 2022-04-25 DIAGNOSIS — N10: ICD-10-CM

## 2022-04-25 DIAGNOSIS — F41.0: ICD-10-CM

## 2022-04-25 DIAGNOSIS — N17.0: ICD-10-CM

## 2022-04-25 DIAGNOSIS — E87.2: ICD-10-CM

## 2022-04-25 DIAGNOSIS — E46: ICD-10-CM

## 2022-04-25 DIAGNOSIS — R79.82: ICD-10-CM

## 2022-04-25 DIAGNOSIS — D64.9: ICD-10-CM

## 2022-04-25 DIAGNOSIS — F43.0: ICD-10-CM

## 2022-04-25 DIAGNOSIS — D69.6: ICD-10-CM

## 2022-04-25 DIAGNOSIS — Z90.49: ICD-10-CM

## 2022-04-25 DIAGNOSIS — Z79.899: ICD-10-CM

## 2022-04-25 DIAGNOSIS — D72.828: ICD-10-CM

## 2022-04-25 DIAGNOSIS — A41.51: Primary | ICD-10-CM

## 2022-04-25 DIAGNOSIS — N20.1: ICD-10-CM

## 2022-04-25 DIAGNOSIS — E83.42: ICD-10-CM

## 2022-04-25 DIAGNOSIS — E87.1: ICD-10-CM

## 2022-04-25 DIAGNOSIS — E83.51: ICD-10-CM

## 2022-04-25 DIAGNOSIS — Z87.891: ICD-10-CM

## 2022-04-25 DIAGNOSIS — N13.5: ICD-10-CM

## 2022-04-25 DIAGNOSIS — R65.20: ICD-10-CM

## 2022-04-25 DIAGNOSIS — B96.20: ICD-10-CM

## 2022-04-25 DIAGNOSIS — R73.9: ICD-10-CM

## 2022-04-25 DIAGNOSIS — B96.89: ICD-10-CM

## 2022-04-25 DIAGNOSIS — K21.9: ICD-10-CM

## 2022-04-25 LAB
AMPHET UR QL SCN: NEGATIVE
AMPHET UR QL SCN: NEGATIVE
AMPHETAMINES UR QL SCN>500 NG/ML: NEGATIVE
ANION GAP SERPL CALC-SCNC: 16.6 MEQ/L (ref 7–13)
BARBITURATES UR QL SCN: NEGATIVE
CHLORIDE SERPL-SCNC: 101 MMOL/L (ref 98–107)
MDMA UR QL SCN: NEGATIVE
OXYCODONE UR QL SCN: NEGATIVE
PCP UR QL SCN: NEGATIVE
SODIUM SERPL-SCNC: 135 MMOL/L (ref 136–145)
TRICYCLICS UR QL SCN: NEGATIVE

## 2022-04-25 PROCEDURE — U0002 COVID-19 LAB TEST NON-CDC: HCPCS

## 2022-04-26 LAB
ANION GAP SERPL CALC-SCNC: 15.6 MEQ/L (ref 7–13)
CHLORIDE SERPL-SCNC: 103 MMOL/L (ref 98–107)
SODIUM SERPL-SCNC: 135 MMOL/L (ref 136–145)

## 2022-04-26 RX ADMIN — POTASSIUM CHLORIDE SCH MEQ: 750 TABLET, FILM COATED, EXTENDED RELEASE ORAL at 12:03

## 2022-04-26 RX ADMIN — SODIUM CHLORIDE AND POTASSIUM CHLORIDE SCH MLS/HR: 9; 2.98 INJECTION, SOLUTION INTRAVENOUS at 00:35

## 2022-04-26 RX ADMIN — SODIUM CHLORIDE AND POTASSIUM CHLORIDE SCH MLS/HR: 9; 2.98 INJECTION, SOLUTION INTRAVENOUS at 16:37

## 2022-04-26 RX ADMIN — POTASSIUM CHLORIDE SCH MEQ: 750 TABLET, FILM COATED, EXTENDED RELEASE ORAL at 08:01

## 2022-04-26 RX ADMIN — OXYCODONE HYDROCHLORIDE AND ACETAMINOPHEN PRN TAB: 5; 325 TABLET ORAL at 06:08

## 2022-04-27 LAB
ANION GAP SERPL CALC-SCNC: 18.2 MEQ/L (ref 7–13)
CHLORIDE SERPL-SCNC: 110 MMOL/L (ref 98–107)
SODIUM SERPL-SCNC: 138 MMOL/L (ref 136–145)

## 2022-04-27 RX ADMIN — OXYCODONE HYDROCHLORIDE AND ACETAMINOPHEN PRN TAB: 5; 325 TABLET ORAL at 17:47

## 2022-04-27 RX ADMIN — OXYCODONE HYDROCHLORIDE AND ACETAMINOPHEN PRN TAB: 5; 325 TABLET ORAL at 07:46

## 2022-04-27 RX ADMIN — OXYCODONE HYDROCHLORIDE AND ACETAMINOPHEN PRN TAB: 5; 325 TABLET ORAL at 23:24

## 2022-04-27 RX ADMIN — SODIUM CHLORIDE AND POTASSIUM CHLORIDE SCH MLS/HR: 9; 2.98 INJECTION, SOLUTION INTRAVENOUS at 13:35

## 2022-04-27 RX ADMIN — SODIUM CHLORIDE AND POTASSIUM CHLORIDE SCH MLS/HR: 9; 2.98 INJECTION, SOLUTION INTRAVENOUS at 03:41

## 2022-04-28 LAB
ANION GAP SERPL CALC-SCNC: 15.3 MEQ/L (ref 7–13)
CHLORIDE SERPL-SCNC: 110 MMOL/L (ref 98–107)
SODIUM SERPL-SCNC: 139 MMOL/L (ref 136–145)

## 2022-04-28 RX ADMIN — VITAMIN D, TAB 1000IU (100/BT) SCH MCG: 25 TAB at 09:02

## 2022-04-28 RX ADMIN — SODIUM CHLORIDE AND POTASSIUM CHLORIDE SCH MLS/HR: 9; 2.98 INJECTION, SOLUTION INTRAVENOUS at 00:10

## 2022-04-28 RX ADMIN — OXYCODONE HYDROCHLORIDE AND ACETAMINOPHEN PRN TAB: 5; 325 TABLET ORAL at 20:02

## 2022-04-28 RX ADMIN — SODIUM CHLORIDE AND POTASSIUM CHLORIDE SCH MLS/HR: 9; 2.98 INJECTION, SOLUTION INTRAVENOUS at 10:46

## 2022-04-28 RX ADMIN — OXYCODONE HYDROCHLORIDE AND ACETAMINOPHEN PRN TAB: 5; 325 TABLET ORAL at 07:37

## 2022-04-28 RX ADMIN — SODIUM CHLORIDE AND POTASSIUM CHLORIDE SCH MLS/HR: 9; 2.98 INJECTION, SOLUTION INTRAVENOUS at 23:21

## 2022-04-29 VITALS — DIASTOLIC BLOOD PRESSURE: 85 MMHG | HEART RATE: 87 BPM | SYSTOLIC BLOOD PRESSURE: 146 MMHG

## 2022-04-29 LAB
ANION GAP SERPL CALC-SCNC: 17.6 MEQ/L (ref 7–13)
CHLORIDE SERPL-SCNC: 110 MMOL/L (ref 98–107)
SODIUM SERPL-SCNC: 139 MMOL/L (ref 136–145)

## 2022-04-29 RX ADMIN — VITAMIN D, TAB 1000IU (100/BT) SCH MCG: 25 TAB at 08:11
